# Patient Record
Sex: FEMALE | Race: BLACK OR AFRICAN AMERICAN | NOT HISPANIC OR LATINO
[De-identification: names, ages, dates, MRNs, and addresses within clinical notes are randomized per-mention and may not be internally consistent; named-entity substitution may affect disease eponyms.]

---

## 2022-06-23 PROBLEM — Z00.00 ENCOUNTER FOR PREVENTIVE HEALTH EXAMINATION: Status: ACTIVE | Noted: 2022-06-23

## 2022-06-28 ENCOUNTER — APPOINTMENT (OUTPATIENT)
Dept: PULMONOLOGY | Facility: CLINIC | Age: 57
End: 2022-06-28

## 2024-03-17 ENCOUNTER — EMERGENCY (EMERGENCY)
Facility: HOSPITAL | Age: 59
LOS: 0 days | Discharge: HOME | End: 2024-03-17
Attending: STUDENT IN AN ORGANIZED HEALTH CARE EDUCATION/TRAINING PROGRAM
Payer: COMMERCIAL

## 2024-03-17 VITALS
OXYGEN SATURATION: 97 % | SYSTOLIC BLOOD PRESSURE: 136 MMHG | HEART RATE: 89 BPM | RESPIRATION RATE: 18 BRPM | DIASTOLIC BLOOD PRESSURE: 83 MMHG

## 2024-03-17 VITALS
DIASTOLIC BLOOD PRESSURE: 82 MMHG | OXYGEN SATURATION: 95 % | RESPIRATION RATE: 18 BRPM | SYSTOLIC BLOOD PRESSURE: 144 MMHG | WEIGHT: 175.05 LBS | TEMPERATURE: 98 F | HEART RATE: 88 BPM | HEIGHT: 68 IN

## 2024-03-17 DIAGNOSIS — R06.02 SHORTNESS OF BREATH: ICD-10-CM

## 2024-03-17 DIAGNOSIS — J45.901 UNSPECIFIED ASTHMA WITH (ACUTE) EXACERBATION: ICD-10-CM

## 2024-03-17 DIAGNOSIS — J82.83 EOSINOPHILIC ASTHMA: ICD-10-CM

## 2024-03-17 RX ORDER — ALBUTEROL 90 UG/1
2 AEROSOL, METERED ORAL ONCE
Refills: 0 | Status: COMPLETED | OUTPATIENT
Start: 2024-03-17 | End: 2024-03-17

## 2024-03-17 RX ORDER — ALBUTEROL 90 UG/1
2 AEROSOL, METERED ORAL
Qty: 1 | Refills: 0
Start: 2024-03-17 | End: 2024-04-15

## 2024-03-17 RX ADMIN — ALBUTEROL 2 PUFF(S): 90 AEROSOL, METERED ORAL at 22:23

## 2024-03-17 NOTE — ED PROVIDER NOTE - CLINICAL SUMMARY MEDICAL DECISION MAKING FREE TEXT BOX
58F pmhx eosinophilic asthma who was sent from urgent care for evaluation of asthma exacerbation with instructions that she 'may need magnesium', she states that she received IM decadron 10mg and duoneb x 2 with improvement of symptoms but there was still a wheeze so urgent care thought she may need further medications. Prior to that, she had wheezing and shortness of breath x 4 days after  upper respiratory infcn with sneezing/ cough/ cold like symptoms. Denies fevers. Now feels much improved, last treatment was at approx 5-503pm. States no dyspnea on exertion or at rest at this time. States she saw pmd 1 week ago and had labs done and has follow up in 2 days. She reports she follows with a pulmonologist and takes breo/ and albuterol at baseline. Never hospitalized for asthma, never intubated, this is first time visiting hospital for symptoms. No chest pain or leg swelling. Patient reports saw cardiologist recently for nuclear stress test in preparation for elective sinus surgery  - states mild defect that is being follow up with cardiac CT.   -clinical hx consistent with asthma exacerbation that was treated prior to arrival, lungs clear, no edema or signs of CHF, no chest pain, afebrile, normal work of breathing, no pleuritic pain or hypoxia, continue outpt pulm follow up, will rx steroids and supply with albuterol inhaler, outpt follow up and return precautions discussed at length

## 2024-03-17 NOTE — ED ADULT TRIAGE NOTE - CHIEF COMPLAINT QUOTE
Patient was recently sent from Urgent Care c/o a productive cough and wheezing for 4 days. Patient did use her inhaler with minimal relief. Urgent Care gave 10mg IM dex and 3 duonebs at 17:30. Minimal wheezing noted. Pmh asthma. O2 95% in triage with no distress.

## 2024-03-17 NOTE — ED PROVIDER NOTE - PATIENT PORTAL LINK FT
You can access the FollowMyHealth Patient Portal offered by [facility] by registering at the following website: [email or web address]. By joining [facility] FollowMyHealth portal, you will also be able to view your health information using other applications (apps) compatible with our system.

## 2024-03-17 NOTE — ED PROVIDER NOTE - PHYSICAL EXAMINATION
Gen: aox3, nad,   Head: NCAT  ENT: Airway patent, moist mucous membranes, nasal passageways clear,   Cardiac: Normal rate, normal rhythm, no murmurs  Respiratory: Lungs CTA B/L, speaking in full sentences, normal effort of breathing, normal respirations with ambulation   Gastrointestinal: Abdomen soft, nontender, nondistended, no rebound, no guarding  MSK: No gross abnormalities, FROM of all four extremities, no edema  HEME: Extremities warm and well perfused   Skin: No rashes, no lesions  Neuro: No gross neurologic deficits, normal speech, no gait abnormality

## 2024-03-17 NOTE — ED ADULT NURSE NOTE - OBJECTIVE STATEMENT
Patient was recently sent from Urgent Care c/o a productive cough and wheezing for 4 days. Patient did use her inhaler with minimal relief. Urgent Care gave 10mg IM dex and 3 duonebs at 17:30. no wheezing noted. Pmh asthma. O2 95% in triage with no distress.

## 2024-03-17 NOTE — ED PROVIDER NOTE - OBJECTIVE STATEMENT
58F pmhx eosinophilic asthma who was sent from urgent care for evaluation of asthma exacerbation with instructions that she 'may need magnesium', she states that she received IM decadron 10mg and duoneb x 2 with improvemetn of symptoms. Prior to that, she had wheezing and shortness of breath x 4 days after  upper respiroaty infcn with sneezing/ cough/ cold like symptoms. Denies fevers. Now feels much improved, last treatment was at approx 5-503pm. States she saw pmd 1 week ago and had labs done and has follow up in 2 days. She reports she follows with a pulmonologist and takes breo/ and albuterol at baseline. Never hospitalized for asthma, never intubated, this is first time visitng hospital for symptoms. No chest pain or leg swelling 58F pmhx eosinophilic asthma who was sent from urgent care for evaluation of asthma exacerbation with instructions that she 'may need magnesium', she states that she received IM decadron 10mg and duoneb x 2 with improvement of symptoms but there was still a wheeze so urgent care thought she may need further medications. Prior to that, she had wheezing and shortness of breath x 4 days after  upper respiratory infcn with sneezing/ cough/ cold like symptoms. Denies fevers. Now feels much improved, last treatment was at approx 5-503pm. States no dyspnea on exertion or at rest at this time. States she saw pmd 1 week ago and had labs done and has follow up in 2 days. She reports she follows with a pulmonologist and takes breo/ and albuterol at baseline. Never hospitalized for asthma, never intubated, this is first time visiting hospital for symptoms. No chest pain or leg swelling. Patient reports saw cardiologist recently for nuclear stress test in preparation for elective sinus surgery  - states mild defect that is being follow up with cardiac CT.

## 2024-03-17 NOTE — ED PROVIDER NOTE - NSFOLLOWUPINSTRUCTIONS_ED_ALL_ED_FT
You were seen today for asthma exacerbation that was treated by urgent care with decadron with improvement in symptoms - decadron can last 48 hours - it is a steroid that can help decrease inflammation of the airways     We did not perform xray or blood work since your symptoms improved after treatment at urgent care.     Continue steroids-> prednisone 40mg once daily for 4 days (take next dose tomorrow evening)    Continue your albuterol inhaler 2 puffs every 4-6 hours as needed for shortness of breath/wheezing. Continue your breo inhaler for maintenance therapy     Call your pulmonologist for follow up     Follow up with primary care physician in 48 hours         SEEK IMMEDIATE MEDICAL CARE IF YOU HAVE ANY OF THE FOLLOWING SYMPTOMS: worsening shortness of breath, chest pain, back pain, abdominal pain, fever, coughing up blood, lightheadedness/dizziness or near fainting or any new concerning symptoms

## 2024-06-10 ENCOUNTER — APPOINTMENT (OUTPATIENT)
Dept: PEDIATRIC ALLERGY IMMUNOLOGY | Facility: CLINIC | Age: 59
End: 2024-06-10

## 2024-09-06 ENCOUNTER — APPOINTMENT (OUTPATIENT)
Dept: PEDIATRIC ALLERGY IMMUNOLOGY | Facility: CLINIC | Age: 59
End: 2024-09-06